# Patient Record
Sex: MALE | Race: WHITE | NOT HISPANIC OR LATINO
[De-identification: names, ages, dates, MRNs, and addresses within clinical notes are randomized per-mention and may not be internally consistent; named-entity substitution may affect disease eponyms.]

---

## 2017-01-06 ENCOUNTER — APPOINTMENT (OUTPATIENT)
Dept: UROLOGY | Facility: CLINIC | Age: 67
End: 2017-01-06

## 2017-01-31 ENCOUNTER — APPOINTMENT (OUTPATIENT)
Dept: UROLOGY | Facility: CLINIC | Age: 67
End: 2017-01-31

## 2017-01-31 VITALS
DIASTOLIC BLOOD PRESSURE: 78 MMHG | TEMPERATURE: 98.1 F | WEIGHT: 210 LBS | HEIGHT: 73 IN | BODY MASS INDEX: 27.83 KG/M2 | HEART RATE: 67 BPM | SYSTOLIC BLOOD PRESSURE: 137 MMHG

## 2017-03-06 ENCOUNTER — FORM ENCOUNTER (OUTPATIENT)
Age: 67
End: 2017-03-06

## 2017-03-07 ENCOUNTER — APPOINTMENT (OUTPATIENT)
Dept: UROLOGY | Facility: CLINIC | Age: 67
End: 2017-03-07

## 2017-03-07 ENCOUNTER — OUTPATIENT (OUTPATIENT)
Dept: OUTPATIENT SERVICES | Facility: HOSPITAL | Age: 67
LOS: 1 days | End: 2017-03-07
Payer: MEDICARE

## 2017-03-07 VITALS
HEIGHT: 73 IN | WEIGHT: 210 LBS | DIASTOLIC BLOOD PRESSURE: 63 MMHG | TEMPERATURE: 97.6 F | BODY MASS INDEX: 27.83 KG/M2 | HEART RATE: 68 BPM | SYSTOLIC BLOOD PRESSURE: 104 MMHG

## 2017-03-07 DIAGNOSIS — N40.1 BENIGN PROSTATIC HYPERPLASIA WITH LOWER URINARY TRACT SYMPTOMS: ICD-10-CM

## 2017-03-07 DIAGNOSIS — N40.1 BENIGN PROSTATIC HYPERPLASIA WITH LOWER URINARY TRACT SYMPMS: ICD-10-CM

## 2017-03-07 DIAGNOSIS — N13.8 BENIGN PROSTATIC HYPERPLASIA WITH LOWER URINARY TRACT SYMPMS: ICD-10-CM

## 2017-03-07 PROCEDURE — 93010 ELECTROCARDIOGRAM REPORT: CPT

## 2017-03-07 PROCEDURE — 71020: CPT | Mod: 26

## 2017-03-07 PROCEDURE — 93005 ELECTROCARDIOGRAM TRACING: CPT

## 2017-03-07 PROCEDURE — 71046 X-RAY EXAM CHEST 2 VIEWS: CPT

## 2017-03-08 LAB
ANION GAP SERPL CALC-SCNC: 13 MMOL/L
APPEARANCE: CLEAR
APTT BLD: 31.5 SEC
BACTERIA: NEGATIVE
BASOPHILS # BLD AUTO: 0.03 K/UL
BASOPHILS NFR BLD AUTO: 0.3 %
BILIRUBIN URINE: NEGATIVE
BLOOD URINE: ABNORMAL
BUN SERPL-MCNC: 20 MG/DL
CALCIUM SERPL-MCNC: 9.7 MG/DL
CHLORIDE SERPL-SCNC: 101 MMOL/L
CO2 SERPL-SCNC: 27 MMOL/L
COLOR: YELLOW
CREAT SERPL-MCNC: 1.04 MG/DL
EOSINOPHIL # BLD AUTO: 0.2 K/UL
EOSINOPHIL NFR BLD AUTO: 2.1 %
GLUCOSE QUALITATIVE U: NORMAL MG/DL
GLUCOSE SERPL-MCNC: 141 MG/DL
HCT VFR BLD CALC: 44.1 %
HGB BLD-MCNC: 14.9 G/DL
IMM GRANULOCYTES NFR BLD AUTO: 0.1 %
INR PPP: 1.08 RATIO
KETONES URINE: NEGATIVE
LEUKOCYTE ESTERASE URINE: NEGATIVE
LYMPHOCYTES # BLD AUTO: 3.28 K/UL
LYMPHOCYTES NFR BLD AUTO: 34.2 %
MAN DIFF?: NORMAL
MCHC RBC-ENTMCNC: 32.3 PG
MCHC RBC-ENTMCNC: 33.8 GM/DL
MCV RBC AUTO: 95.5 FL
MICROSCOPIC-UA: NORMAL
MONOCYTES # BLD AUTO: 0.48 K/UL
MONOCYTES NFR BLD AUTO: 5 %
NEUTROPHILS # BLD AUTO: 5.58 K/UL
NEUTROPHILS NFR BLD AUTO: 58.3 %
NITRITE URINE: NEGATIVE
PH URINE: 6
PLATELET # BLD AUTO: 134 K/UL
POTASSIUM SERPL-SCNC: 4.9 MMOL/L
PROTEIN URINE: NEGATIVE MG/DL
PT BLD: 12.2 SEC
RBC # BLD: 4.62 M/UL
RBC # FLD: 13.7 %
RED BLOOD CELLS URINE: 12 /HPF
SODIUM SERPL-SCNC: 141 MMOL/L
SPECIFIC GRAVITY URINE: 1.02
SQUAMOUS EPITHELIAL CELLS: 1 /HPF
UROBILINOGEN URINE: NORMAL MG/DL
WBC # FLD AUTO: 9.58 K/UL
WHITE BLOOD CELLS URINE: 13 /HPF

## 2017-03-09 LAB — BACTERIA UR CULT: NORMAL

## 2018-04-02 ENCOUNTER — RX RENEWAL (OUTPATIENT)
Age: 68
End: 2018-04-02

## 2023-05-19 ENCOUNTER — APPOINTMENT (OUTPATIENT)
Dept: NEUROLOGY | Facility: CLINIC | Age: 73
End: 2023-05-19
Payer: MEDICARE

## 2023-05-19 ENCOUNTER — LABORATORY RESULT (OUTPATIENT)
Age: 73
End: 2023-05-19

## 2023-05-19 VITALS
BODY MASS INDEX: 25.84 KG/M2 | TEMPERATURE: 96 F | HEIGHT: 73 IN | SYSTOLIC BLOOD PRESSURE: 131 MMHG | HEART RATE: 65 BPM | WEIGHT: 195 LBS | DIASTOLIC BLOOD PRESSURE: 78 MMHG | OXYGEN SATURATION: 99 %

## 2023-05-19 PROCEDURE — 99204 OFFICE O/P NEW MOD 45 MIN: CPT

## 2023-05-19 RX ORDER — SITAGLIPTIN 100 MG/1
100 TABLET, FILM COATED ORAL
Refills: 0 | Status: ACTIVE | COMMUNITY

## 2023-05-19 NOTE — PHYSICAL EXAM
[FreeTextEntry1] : General:\par Constitutional:  Sitting comfortably in NAD.\par Psychiatric: clothing appeared mildly soiled, affect even, insight/judgment intact\par Ears, Nose, Throat: no abnormalities, mucus membranes moist\par Mallampati: \par Neck: supple, no lymphadenopathy or nodules palpable\par Neck Circumference:\par Extremities: no edema, clubbing or cyanosis\par Skin:  no rash or neurocutaneous signs \par \par Cognitive:\par Orientation, language, memory and knowledge screens intact.\par Cranial Nerves:\par II: III/IV/VI: PERRL EOMF No nystagmus\par V1V2V3: Symmetric, VII: Face appears symmetric VIII: Normal to screening, IX/X: Palate Elevates Symmetrical  XI: Trapezius Symmetric  XII: Tongue midline\par \par Motor:\par Power: 5/5 throughout\par Tone: normal x 4 limbs, \par Tremor: none\par \par Sensation:\par Mild vibratory losses in the fingertips.\par No vibratory sensation at the toes, significant losses at the ankles and the tibial tuberosity.\par \par Coordination/Gait:\par Finger-nose-finger intact, normal rapid-alternating movements.\par Fine motor normal with normal rapid finger taps and heel tapping \par Wide-based gait, with steppage-like character.\par Romberg better than expected\par \par Reflexes:\par DTR: 1-2+ symmetric x 4 limbs\par \par

## 2023-05-19 NOTE — DISCUSSION/SUMMARY
[FreeTextEntry1] : Impression:\par 1) gait dysfunction - feels a bit lightheaded at times, but most often the problem is 'not knowing' where his feet are.\par 2) vibration-sense losses in the L/E and in the fingertips as well, implies a length dependent sensory neuropathy.  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

## 2023-05-19 NOTE — CONSULT LETTER
[Dear  ___] : Dear  [unfilled], [Consult Letter:] : I had the pleasure of evaluating your patient, [unfilled]. [Please see my note below.] : Please see my note below. [Consult Closing:] : Thank you very much for allowing me to participate in the care of this patient.  If you have any questions, please do not hesitate to contact me. [Sincerely,] : Sincerely, [FreeTextEntry2] : [facility]\par [address] [place] [address]\par T [phone], Fax [phone] [FreeTextEntry1] : . [FreeTextEntry3] : Jai Alarcon MD MSc\par Vice-Chair, Neurology

## 2023-05-19 NOTE — HISTORY OF PRESENT ILLNESS
[FreeTextEntry1] : \nancy Briggs is a 71 yo man, presenting today with complaints of balance issues.\par \nancy Has orthotics being made.\par Currently walking with a cane.\par \par He feels he does not know where he is placing his feet.  He feels that the street is a much flatter level as the sidewalk is often tilted.\par \par Sometimes he will feel dizzy in the head.  Often when arising from bed in the morning it is more obvious.\par \par Seen by urologist last week - diagnosed with a diabetic problem.\par He is trying to lose weight, now down to 195lbs.\par \par Thyroid was Irradiated/ablated.

## 2023-05-20 LAB
ALBUMIN SERPL ELPH-MCNC: 4.3 G/DL
ALP BLD-CCNC: 82 U/L
ALT SERPL-CCNC: 11 U/L
ANION GAP SERPL CALC-SCNC: 12 MMOL/L
AST SERPL-CCNC: 20 U/L
BILIRUB SERPL-MCNC: 0.9 MG/DL
BUN SERPL-MCNC: 16 MG/DL
CALCIUM SERPL-MCNC: 9.8 MG/DL
CHLORIDE SERPL-SCNC: 103 MMOL/L
CK SERPL-CCNC: 126 U/L
CO2 SERPL-SCNC: 27 MMOL/L
CREAT SERPL-MCNC: 0.97 MG/DL
CRP SERPL-MCNC: <3 MG/L
EGFR: 82 ML/MIN/1.73M2
ERYTHROCYTE [SEDIMENTATION RATE] IN BLOOD BY WESTERGREN METHOD: 7 MM/HR
FOLATE SERPL-MCNC: 18 NG/ML
GLUCOSE SERPL-MCNC: 78 MG/DL
POTASSIUM SERPL-SCNC: 4.7 MMOL/L
PROT SERPL-MCNC: 7 G/DL
SODIUM SERPL-SCNC: 143 MMOL/L
VIT B12 SERPL-MCNC: 250 PG/ML

## 2023-05-21 LAB
ENA SS-A AB SER IA-ACNC: <0.2 AL
ENA SS-B AB SER IA-ACNC: <0.2 AL

## 2023-05-22 LAB — ANA SER IF-ACNC: NEGATIVE

## 2023-05-23 LAB — GAD65 AB SER-MCNC: 0 NMOL/L

## 2023-05-24 LAB
ALBUMIN MFR SERPL ELPH: 58.6 %
ALBUMIN SERPL-MCNC: 4.1 G/DL
ALBUMIN/GLOB SERPL: 1.4 RATIO
ALPHA1 GLOB MFR SERPL ELPH: 3.9 %
ALPHA1 GLOB SERPL ELPH-MCNC: 0.3 G/DL
ALPHA2 GLOB MFR SERPL ELPH: 11.2 %
ALPHA2 GLOB SERPL ELPH-MCNC: 0.8 G/DL
B-GLOBULIN MFR SERPL ELPH: 10.9 %
B-GLOBULIN SERPL ELPH-MCNC: 0.8 G/DL
GAMMA GLOB FLD ELPH-MCNC: 1.1 G/DL
GAMMA GLOB MFR SERPL ELPH: 15.4 %
INTERPRETATION SERPL IEP-IMP: NORMAL
M PROTEIN SPEC IFE-MCNC: NORMAL
PROT SERPL-MCNC: 7 G/DL
PROT SERPL-MCNC: 7 G/DL

## 2024-03-06 ENCOUNTER — APPOINTMENT (OUTPATIENT)
Dept: ORTHOPEDIC SURGERY | Facility: CLINIC | Age: 74
End: 2024-03-06
Payer: MEDICARE

## 2024-03-06 VITALS — WEIGHT: 197 LBS | HEIGHT: 73 IN | RESPIRATION RATE: 16 BRPM | BODY MASS INDEX: 26.11 KG/M2

## 2024-03-06 DIAGNOSIS — M79.644 PAIN IN RIGHT FINGER(S): ICD-10-CM

## 2024-03-06 PROCEDURE — 20605 DRAIN/INJ JOINT/BURSA W/O US: CPT | Mod: RT

## 2024-03-06 PROCEDURE — 99204 OFFICE O/P NEW MOD 45 MIN: CPT | Mod: 25

## 2024-05-17 ENCOUNTER — APPOINTMENT (OUTPATIENT)
Dept: NEUROLOGY | Facility: CLINIC | Age: 74
End: 2024-05-17
Payer: MEDICARE

## 2024-05-17 VITALS
TEMPERATURE: 97.4 F | HEART RATE: 66 BPM | HEIGHT: 73 IN | DIASTOLIC BLOOD PRESSURE: 69 MMHG | OXYGEN SATURATION: 97 % | SYSTOLIC BLOOD PRESSURE: 121 MMHG

## 2024-05-17 DIAGNOSIS — R26.9 UNSPECIFIED ABNORMALITIES OF GAIT AND MOBILITY: ICD-10-CM

## 2024-05-17 LAB
A-TUMOR NECROSIS FACT SERPL-MCNC: <1.7 PG/ML
ESTIMATED AVERAGE GLUCOSE: 160 MG/DL
HBA1C MFR BLD HPLC: 7.2 %
HCT VFR BLD CALC: 44.7 %
HGB BLD-MCNC: 14.4 G/DL
IGNF SERPL-MCNC: <4.2 PG/ML
IL10 SERPL-MCNC: 3 PG/ML
IL12 SERPL-MCNC: <1.9 PG/ML
IL13 SERPL-MCNC: <1.7 PG/ML
IL17A SERPL-MCNC: <1.4 PG/ML
IL2 SERPL-MCNC: 833.4 PG/ML
IL2 SERPL-MCNC: <2.1 PG/ML
IL4 SERPL-MCNC: <2.2 PG/ML
IL6 SERPL-MCNC: <2 PG/ML
IL8 SERPL-MCNC: <3 PG/ML
INTERLEUKIN 1 BETA: <6.5 PG/ML
INTERLEUKIN 5: <2.1 PG/ML
MCHC RBC-ENTMCNC: 31.4 PG
MCHC RBC-ENTMCNC: 32.2 GM/DL
MCV RBC AUTO: 97.4 FL
PLATELET # BLD AUTO: 144 K/UL
RBC # BLD: 4.59 M/UL
RBC # FLD: 13.7 %
THYROGLOB AB SERPL-ACNC: <20 IU/ML
THYROPEROXIDASE AB SERPL IA-ACNC: 35.9 IU/ML
TSH SERPL-ACNC: 6.69 UIU/ML
WBC # FLD AUTO: 8.32 K/UL

## 2024-05-17 PROCEDURE — 99213 OFFICE O/P EST LOW 20 MIN: CPT

## 2024-05-17 NOTE — HISTORY OF PRESENT ILLNESS
[FreeTextEntry1] :  Chester is a 72 yo man, presenting today with complaints of balance issues.  He was seen exactly a year ago for a neuropathy work-up. There was lab-tests performed as the screening evaluation, which showed no  immunological contributor, but that his HbA1c was indeed still elevated at 7.2%. The B12 level was just normal at 250 pg/mL.  Has b12 injections every 2 weeks. TSHx was 6.69, but Free t$ was normal. TPO minimally elevated at 35.9.  Currently walking with a cane.  Still feeling gait is not that steady.  Walks on the street rather than the sidewalk because it is more flat. Has orthotics being made but not happy with them.  He feels he does not know where he is placing his feet.  He feels that the street is a much flatter level as the sidewalk is often tilted.  Sometimes he will feel dizzy in the head.  Often when arising from bed in the morning it is more obvious.  Seen by urologist - diagnosed with a diabetic problem. He is trying to lose weight, now down to 195lbs. Thyroid was Irradiated/ablated.

## 2024-05-17 NOTE — DISCUSSION/SUMMARY
[FreeTextEntry1] : Impression: 1) gait dysfunction - with signs of length dependent neuropathy, but normal romberg makes posterior column/B12 or large fiber neuropathy less likely as a cause. 2) Can check for neuropathy with EMG/NCS.  Can check for amyloid through genetic testing. 3) diabetes - Januvia and metformin - ozempic has been diabetic neuropathy improve and endocrine could consider this as an option.   Plan: 1) EMG/NCS for large vs small fiber neuropathy 2) MRI brain to r/o strucutral changes etc NPH. 3) multi-vitamin may help

## 2024-05-17 NOTE — CONSULT LETTER
[Dear  ___] : Dear  [unfilled], [Courtesy Letter:] : I had the pleasure of seeing your patient, [unfilled], in my office today. [FreeTextEntry2] : Dr. Heidy Thompson MD Internal Medicine [facility] [address] [place] [id number] T: [phone]  F: [phone]

## 2024-05-17 NOTE — PHYSICAL EXAM
[FreeTextEntry1] : General: Constitutional:  Sitting comfortably in NAD. Psychiatric: clothing appeared mildly unkept, affect even, insight/judgment intact Ears, Nose, Throat: no abnormalities, mucus membranes moist Extremities: no edema, clubbing or cyanosis  Cognitive: Orientation, language, memory and knowledge screens intact. Cranial Nerves: II: III/IV/VI: PERRL EOMF No nystagmus V1V2V3: Symmetric, VII: Face appears symmetric VIII: Normal to screening, IX/X: Palate Elevates Symmetrical  XI: Trapezius Symmetric  XII: Tongue midline  Motor: Power: 5/5 throughout Tone: normal x 4 limbs,  Tremor: none  Coordination/Gait:  mildly wide-based gait, some steppage type.  Finger-nose-finger intact, normal rapid-alternating movements. Fine motor normal with normal rapid finger taps and heel tapping  Romberg better than expected  Reflexes: DTR: 1-2+ symmetric x 4 limbs

## 2024-10-03 ENCOUNTER — NON-APPOINTMENT (OUTPATIENT)
Age: 74
End: 2024-10-03

## 2024-10-04 ENCOUNTER — APPOINTMENT (OUTPATIENT)
Dept: NEUROLOGY | Age: 74
End: 2024-10-04
Payer: MEDICARE

## 2024-10-04 VITALS
BODY MASS INDEX: 25.71 KG/M2 | HEIGHT: 73 IN | RESPIRATION RATE: 16 BRPM | DIASTOLIC BLOOD PRESSURE: 69 MMHG | TEMPERATURE: 98 F | HEART RATE: 71 BPM | WEIGHT: 194 LBS | SYSTOLIC BLOOD PRESSURE: 122 MMHG | OXYGEN SATURATION: 97 %

## 2024-10-04 PROCEDURE — 95886 MUSC TEST DONE W/N TEST COMP: CPT

## 2024-10-04 PROCEDURE — 95911 NRV CNDJ TEST 9-10 STUDIES: CPT

## 2024-10-04 NOTE — PROCEDURE
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   EMG REPORT WILL BE UPLOADED SEPARATELY AS A PDF

## 2024-10-29 ENCOUNTER — APPOINTMENT (OUTPATIENT)
Dept: ORTHOPEDIC SURGERY | Facility: CLINIC | Age: 74
End: 2024-10-29
Payer: MEDICARE

## 2024-10-29 DIAGNOSIS — M79.644 PAIN IN RIGHT FINGER(S): ICD-10-CM

## 2024-10-29 PROCEDURE — 99214 OFFICE O/P EST MOD 30 MIN: CPT

## 2024-10-30 ENCOUNTER — APPOINTMENT (OUTPATIENT)
Dept: NEUROLOGY | Age: 74
End: 2024-10-30
Payer: MEDICARE

## 2024-10-30 ENCOUNTER — NON-APPOINTMENT (OUTPATIENT)
Age: 74
End: 2024-10-30

## 2024-10-30 VITALS
RESPIRATION RATE: 17 BRPM | OXYGEN SATURATION: 96 % | TEMPERATURE: 97.7 F | DIASTOLIC BLOOD PRESSURE: 63 MMHG | BODY MASS INDEX: 25.71 KG/M2 | HEIGHT: 73 IN | HEART RATE: 76 BPM | SYSTOLIC BLOOD PRESSURE: 101 MMHG | WEIGHT: 194 LBS

## 2024-10-30 DIAGNOSIS — R41.3 OTHER AMNESIA: ICD-10-CM

## 2024-10-30 PROCEDURE — G2212 PROLONG OUTPT/OFFICE VIS: CPT

## 2024-10-30 PROCEDURE — G2211 COMPLEX E/M VISIT ADD ON: CPT

## 2024-10-30 PROCEDURE — 99215 OFFICE O/P EST HI 40 MIN: CPT

## 2024-11-07 ENCOUNTER — APPOINTMENT (OUTPATIENT)
Dept: MRI IMAGING | Age: 74
End: 2024-11-07
Payer: MEDICARE

## 2024-11-07 PROCEDURE — 0866T QUAN MRI ALYS BRN W/DX MRI: CPT

## 2024-11-07 PROCEDURE — 70551 MRI BRAIN STEM W/O DYE: CPT

## 2025-01-09 ENCOUNTER — NON-APPOINTMENT (OUTPATIENT)
Age: 75
End: 2025-01-09

## 2025-01-09 ENCOUNTER — APPOINTMENT (OUTPATIENT)
Dept: NEUROLOGY | Age: 75
End: 2025-01-09
Payer: MEDICARE

## 2025-01-09 VITALS
DIASTOLIC BLOOD PRESSURE: 77 MMHG | SYSTOLIC BLOOD PRESSURE: 122 MMHG | OXYGEN SATURATION: 98 % | HEART RATE: 87 BPM | BODY MASS INDEX: 25.45 KG/M2 | WEIGHT: 192 LBS | HEIGHT: 73 IN | RESPIRATION RATE: 17 BRPM | TEMPERATURE: 98 F

## 2025-01-09 PROCEDURE — 99215 OFFICE O/P EST HI 40 MIN: CPT

## 2025-01-09 RX ORDER — DONEPEZIL HYDROCHLORIDE 10 MG/1
10 TABLET ORAL
Qty: 30 | Refills: 6 | Status: ACTIVE | COMMUNITY
Start: 2025-01-09 | End: 1900-01-01

## 2025-01-09 RX ORDER — DONEPEZIL HYDROCHLORIDE 5 MG/1
5 TABLET ORAL DAILY
Qty: 44 | Refills: 0 | Status: ACTIVE | COMMUNITY
Start: 2025-01-09 | End: 1900-01-01

## 2025-03-10 ENCOUNTER — APPOINTMENT (OUTPATIENT)
Dept: NEUROLOGY | Facility: CLINIC | Age: 75
End: 2025-03-10

## 2025-03-10 VITALS
BODY MASS INDEX: 24.78 KG/M2 | SYSTOLIC BLOOD PRESSURE: 117 MMHG | WEIGHT: 187 LBS | OXYGEN SATURATION: 97 % | TEMPERATURE: 97 F | HEART RATE: 64 BPM | HEIGHT: 73 IN | DIASTOLIC BLOOD PRESSURE: 71 MMHG

## 2025-03-10 DIAGNOSIS — R26.9 UNSPECIFIED ABNORMALITIES OF GAIT AND MOBILITY: ICD-10-CM

## 2025-03-10 PROCEDURE — G2211 COMPLEX E/M VISIT ADD ON: CPT

## 2025-03-10 PROCEDURE — 99214 OFFICE O/P EST MOD 30 MIN: CPT

## 2025-03-10 RX ORDER — FINASTERIDE 5 MG/1
5 TABLET, FILM COATED ORAL DAILY
Refills: 0 | Status: ACTIVE | COMMUNITY

## 2025-03-11 LAB
CRP SERPL-MCNC: <3 MG/L
ENA SS-A AB SER IA-ACNC: <0.2 AL
ENA SS-B AB SER IA-ACNC: <0.2 AL
ERYTHROCYTE [SEDIMENTATION RATE] IN BLOOD BY WESTERGREN METHOD: 3 MM/HR
FOLATE SERPL-MCNC: 8.6 NG/ML
VIT B12 SERPL-MCNC: 1939 PG/ML

## 2025-03-12 LAB — ANA SER IF-ACNC: NEGATIVE

## 2025-03-13 LAB
A-TUMOR NECROSIS FACT SERPL-MCNC: <1.7 PG/ML
IGNF SERPL-MCNC: <4.2 PG/ML
IL10 SERPL-MCNC: <2.8 PG/ML
IL12 SERPL-MCNC: <1.9 PG/ML
IL13 SERPL-MCNC: <1.7 PG/ML
IL17A SERPL-MCNC: <1.4 PG/ML
IL2 SERPL-MCNC: 822.8 PG/ML
IL2 SERPL-MCNC: <2.1 PG/ML
IL4 SERPL-MCNC: <2.2 PG/ML
IL6 SERPL-MCNC: <2 PG/ML
IL8 SERPL-MCNC: 7.8 PG/ML
INTERLEUKIN 1 BETA: <6.5 PG/ML
INTERLEUKIN 5: <2.1 PG/ML

## 2025-03-14 LAB
ALBUMIN MFR SERPL ELPH: 61.5 %
ALBUMIN SERPL-MCNC: 4.1 G/DL
ALBUMIN/GLOB SERPL: 1.6 RATIO
ALPHA1 GLOB MFR SERPL ELPH: 3.9 %
ALPHA1 GLOB SERPL ELPH-MCNC: 0.3 G/DL
ALPHA2 GLOB MFR SERPL ELPH: 10.2 %
ALPHA2 GLOB SERPL ELPH-MCNC: 0.7 G/DL
ASIALO-GM1 ANTIBODIES, IGG/IGM: 8 IV
B-GLOBULIN MFR SERPL ELPH: 10.2 %
B-GLOBULIN SERPL ELPH-MCNC: 0.7 G/DL
GAMMA GLOB FLD ELPH-MCNC: 0.9 G/DL
GAMMA GLOB MFR SERPL ELPH: 14.2 %
GD1A ANTIBODIES, IGG/IGM: 7 IV
GD1B ANTIBODIES, IGG/IGM: 8 IV
GM1 ANTIBODIES, IGG/IGM: 6 IV
GM2 ANTIBODIES, IGG/IGM: 5 IV
GQ1B ANTIBODIES, IGG/IGM: 7 IV
INTERPRETATION SERPL IEP-IMP: NORMAL
M PROTEIN SPEC IFE-MCNC: NORMAL
PROT SERPL-MCNC: 6.6 G/DL
PROT SERPL-MCNC: 6.6 G/DL

## 2025-03-21 ENCOUNTER — APPOINTMENT (OUTPATIENT)
Dept: MRI IMAGING | Facility: CLINIC | Age: 75
End: 2025-03-21
Payer: MEDICARE

## 2025-03-21 PROCEDURE — 72148 MRI LUMBAR SPINE W/O DYE: CPT

## 2025-04-01 PROBLEM — G62.9 AXONAL POLYNEUROPATHY: Status: ACTIVE | Noted: 2025-04-01

## 2025-05-30 ENCOUNTER — APPOINTMENT (OUTPATIENT)
Facility: CLINIC | Age: 75
End: 2025-05-30

## 2025-05-30 VITALS
OXYGEN SATURATION: 97 % | SYSTOLIC BLOOD PRESSURE: 130 MMHG | DIASTOLIC BLOOD PRESSURE: 80 MMHG | HEART RATE: 60 BPM | TEMPERATURE: 97.5 F | WEIGHT: 185 LBS | BODY MASS INDEX: 24.52 KG/M2 | HEIGHT: 73 IN

## 2025-05-30 RX ORDER — TAMSULOSIN HYDROCHLORIDE 0.4 MG/1
0.4 CAPSULE ORAL
Qty: 180 | Refills: 3 | Status: ACTIVE | COMMUNITY

## 2025-05-30 RX ORDER — LEVOTHYROXINE SODIUM 0.15 MG/1
150 TABLET ORAL
Refills: 0 | Status: ACTIVE | COMMUNITY

## 2025-05-30 RX ORDER — LEVOTHYROXINE SODIUM 175 UG/1
175 CAPSULE ORAL
Refills: 0 | Status: ACTIVE | COMMUNITY

## 2025-08-07 ENCOUNTER — NON-APPOINTMENT (OUTPATIENT)
Age: 75
End: 2025-08-07

## 2025-09-05 ENCOUNTER — APPOINTMENT (OUTPATIENT)
Facility: CLINIC | Age: 75
End: 2025-09-05